# Patient Record
Sex: FEMALE | Race: WHITE | NOT HISPANIC OR LATINO | Employment: FULL TIME | URBAN - METROPOLITAN AREA
[De-identification: names, ages, dates, MRNs, and addresses within clinical notes are randomized per-mention and may not be internally consistent; named-entity substitution may affect disease eponyms.]

---

## 2023-09-11 ENCOUNTER — APPOINTMENT (OUTPATIENT)
Dept: PRIMARY CARE | Facility: CLINIC | Age: 38
End: 2023-09-11
Payer: COMMERCIAL

## 2023-10-23 PROBLEM — S83.8X1A SPRAIN OF MEDIAL MENISCUS OF RIGHT KNEE: Status: ACTIVE | Noted: 2023-10-23

## 2023-10-25 ENCOUNTER — OFFICE VISIT (OUTPATIENT)
Dept: DERMATOLOGY | Facility: CLINIC | Age: 38
End: 2023-10-25
Payer: COMMERCIAL

## 2023-10-25 DIAGNOSIS — D22.9 MULTIPLE BENIGN MELANOCYTIC NEVI: ICD-10-CM

## 2023-10-25 DIAGNOSIS — D18.01 CHERRY ANGIOMA: ICD-10-CM

## 2023-10-25 DIAGNOSIS — L73.8 SEBACEOUS HYPERPLASIA OF FACE: Primary | ICD-10-CM

## 2023-10-25 DIAGNOSIS — D22.9 NEVUS SEBACEOUS: ICD-10-CM

## 2023-10-25 PROCEDURE — 99204 OFFICE O/P NEW MOD 45 MIN: CPT | Performed by: DERMATOLOGY

## 2023-10-25 RX ORDER — TRETINOIN 0.25 MG/G
CREAM TOPICAL NIGHTLY
Qty: 20 G | Refills: 11 | Status: SHIPPED | OUTPATIENT
Start: 2023-10-25 | End: 2024-10-24

## 2023-10-25 NOTE — PROGRESS NOTES
Subjective     Olena León is a 38 y.o. female who presents for new patient visit for the following: skin check    Lesions of concern: forehead, present for months, asymptomatic, not changing  History of skin cancer: denies  Family history of skin cancer: denies  History of blistering sunburns or tanning bed use: reports use of tanning beds occasionally between age [age]    Review of Systems:  No other skin or systemic complaints other than what is documented elsewhere in the note.    The following portions of the chart were reviewed this encounter and updated as appropriate:       Specialty Problems    None    Past Medical History:  Olena León  has a past medical history of Personal history of other diseases of the respiratory system (2013), Personal history of other diseases of the respiratory system (2013), and Pregnant state, incidental.    Past Surgical History:  Olena León  has a past surgical history that includes  section, classic (2013) and Ankle arthroscopy w/ open repair (2015).    Family History:  Patient family history is not on file.    Social History:  Olena León  reports that she has never smoked. She does not have any smokeless tobacco history on file. She reports current alcohol use of about 1.0 standard drink of alcohol per week. No history on file for drug use.    Allergies:  Patient has no known allergies.    Current Medications / CAM's:    Current Outpatient Medications:     tretinoin (Retin-A) 0.025 % cream, Apply topically once daily at bedtime., Disp: 20 g, Rfl: 11     Objective   Well appearing patient in no apparent distress; mood and affect are within normal limits.    A full examination was performed including scalp, head, eyes, ears, nose, lips, neck, chest, axillae, abdomen, back, buttocks, bilateral upper extremities, bilateral lower extremities, hands, feet, fingers, toes, fingernails, and toenails. All findings within normal  limits unless otherwise noted below.    - scattered regular brown macules and papules    - scattered small bright red papules and macules    Head - Anterior (Face)  Skin colored to yellow umbilicated papules on the central forehead    Mid Occipital Scalp  Erythematous skin colored to yellow verrucous plaque on the occipital scalp       Assessment/Plan   Sebaceous hyperplasia of face  Head - Anterior (Face)    Reassured pt of benign nature of these lesions and that they represent overgrowth of oil glands on the skin  No treatment is required  Pt interested in cosmetic treatment. Reviewed option of destruction with cautery and that this would not be covered by insurance. Reviewed resident discount. Discussed that treatment will not prevent new ones from forming, risk for scarring or dark spots. Advised her to avoid sun exposure prior to and after treatment to prevent scarring.  Reviewed that topical tretinoin may help reduce the appearance of them  Start tretinoin 0.025% once nightly. Start with use every other night then gradually increase to nightly use. Reviewed side effects of dry skin. Discontinue for 5 days prior to cosmetic visit   Follow up in the winter for cosmetic destruction of sebaceous. hyperplasia on the forehead    Related Procedures  Follow Up In Dermatology - Established Patient    Related Medications  tretinoin (Retin-A) 0.025 % cream  Apply topically once daily at bedtime.    Multiple benign melanocytic nevi    Benign melanocytic nevi  - Discussed benign nature and that no treatment is necessary unless it becomes painful or increases in size. Patient opts for clinical monitoring at this time.    - Sun protective behavior reviewed and encouraged including the use of over-the-counter sunscreen with SPF30+ daily (reapply every 1.5 hours when outdoors), UPF clothing, broad rimmed hats, sunglasses, and avoidance of midday sun. Home skin monitoring encouraged and how to monitor for skin cancer (changing  or new moles, new rapidly growing or non-healing lesions) reviewed. Patient encouraged to call with interval concerns or changes.      [place] angioma    Cherry angioma(s)  - Discussed benign nature and that no treatment is necessary unless it becomes painful or increases in size. Patient opts for clinical monitoring at this time.      Nevus sebaceous  Mid Occipital Scalp    Pt reports lesion has been present since at least childhood  Reassured pt of benign nature of this lesion and that no further treatment is needed  Photo obtained today      Follow up for cosmetic visit for cautery of sebaceous hyperplasia in Winter 2024  Follow up in 2-3 years for skin check or sooner if lesions of concern    Josefa Lawrence MD

## 2023-10-25 NOTE — PROGRESS NOTES
I saw and evaluated the patient. I personally obtained the key and critical portions of the history and physical exam or was physically present for key and critical portions performed by the resident/fellow. I reviewed the resident/fellow's documentation and discussed the patient with the resident/fellow. I agree with the resident/fellow's medical decision making as documented in the note.    Aubrie Junior MD

## 2023-11-27 ENCOUNTER — APPOINTMENT (OUTPATIENT)
Dept: PRIMARY CARE | Facility: CLINIC | Age: 38
End: 2023-11-27
Payer: COMMERCIAL

## 2023-11-27 ENCOUNTER — OFFICE VISIT (OUTPATIENT)
Dept: PRIMARY CARE | Facility: CLINIC | Age: 38
End: 2023-11-27
Payer: COMMERCIAL

## 2023-11-27 VITALS
WEIGHT: 221 LBS | HEART RATE: 107 BPM | OXYGEN SATURATION: 97 % | RESPIRATION RATE: 18 BRPM | BODY MASS INDEX: 34.69 KG/M2 | SYSTOLIC BLOOD PRESSURE: 114 MMHG | HEIGHT: 67 IN | DIASTOLIC BLOOD PRESSURE: 76 MMHG

## 2023-11-27 DIAGNOSIS — R53.83 OTHER FATIGUE: ICD-10-CM

## 2023-11-27 DIAGNOSIS — Z00.00 WELLNESS EXAMINATION: Primary | ICD-10-CM

## 2023-11-27 PROBLEM — S83.8X1A SPRAIN OF MEDIAL MENISCUS OF RIGHT KNEE: Status: RESOLVED | Noted: 2023-10-23 | Resolved: 2023-11-27

## 2023-11-27 PROCEDURE — 99395 PREV VISIT EST AGE 18-39: CPT | Performed by: NURSE PRACTITIONER

## 2023-11-27 ASSESSMENT — PROMIS GLOBAL HEALTH SCALE
RATE_SOCIAL_SATISFACTION: VERY GOOD
CARRYOUT_PHYSICAL_ACTIVITIES: COMPLETELY
RATE_PHYSICAL_HEALTH: GOOD
RATE_MENTAL_HEALTH: GOOD
RATE_QUALITY_OF_LIFE: VERY GOOD
CARRYOUT_SOCIAL_ACTIVITIES: VERY GOOD
EMOTIONAL_PROBLEMS: RARELY
RATE_AVERAGE_PAIN: 0
RATE_GENERAL_HEALTH: VERY GOOD
RATE_AVERAGE_FATIGUE: MILD

## 2023-11-27 NOTE — PATIENT INSTRUCTIONS
The Plate Method.   Continue to stay active walk at least 40 min a day most days a week  It was nice to meet you!

## 2023-11-27 NOTE — PROGRESS NOTES
"Subjective   Patient ID: Olena León is a 38 y.o. female who presents for Annual Exam (Olena is here today for annual CPE, reports no concerns at this time. ).    38-year-old female here for yearly exam .  Looking to get an order for fasting blood work   always fatigued. Hot and sweaty at HS. Struggling to lose weight.   Works out- walk 3-4 miles a week.  Could be better with food choices and portions  Dental-every 6 months without concern  [id number]-date no vision concerns  Works as a teacher eighth grade  No Nicotene  GYN up to date         Review of Systems   All other systems reviewed and are negative.      Objective   /76   Pulse 107   Resp 18   Ht 1.689 m (5' 6.5\")   Wt 100 kg (221 lb)   SpO2 97%   BMI 35.14 kg/m²     Physical Exam  Vitals and nursing note reviewed.   Constitutional:       General: She is not in acute distress.     Appearance: Normal appearance. She is normal weight.   HENT:      Head: Normocephalic and atraumatic.      Nose: Nose normal.      Mouth/Throat:      Mouth: Mucous membranes are moist.      Pharynx: Oropharynx is clear.   Eyes:      Extraocular Movements: Extraocular movements intact.      Conjunctiva/sclera: Conjunctivae normal.      Pupils: Pupils are equal, round, and reactive to light.   Neck:      Vascular: No carotid bruit.   Cardiovascular:      Rate and Rhythm: Normal rate and regular rhythm.      Pulses: Normal pulses.      Heart sounds: Normal heart sounds.   Pulmonary:      Effort: Pulmonary effort is normal.      Breath sounds: Normal breath sounds.   Abdominal:      General: Bowel sounds are normal. There is no distension.      Palpations: Abdomen is soft.      Tenderness: There is no abdominal tenderness.   Musculoskeletal:         General: Normal range of motion.      Cervical back: Normal range of motion and neck supple.      Right lower leg: No edema.      Left lower leg: No edema.   Lymphadenopathy:      Cervical: No cervical adenopathy. "   Skin:     General: Skin is warm and dry.      Capillary Refill: Capillary refill takes less than 2 seconds.   Neurological:      General: No focal deficit present.      Mental Status: She is alert and oriented to person, place, and time.      Motor: No weakness.   Psychiatric:         Mood and Affect: Mood normal.         Behavior: Behavior normal.         Thought Content: Thought content normal.         Judgment: Judgment normal.         Assessment/Plan   Diagnoses and all orders for this visit:  Wellness examination  Comments:  Update fasting lipid panel, CMP, CBC at her convenience.  She does not do vaccines.  Orders:  -     CBC and Auto Differential; Future  -     Comprehensive Metabolic Panel; Future  -     Lipid Panel; Future  -     Follow Up In Primary Care - Other; Future  Other fatigue  Comments:  Check TSH  Orders:  -     TSH with reflex to Free T4 if abnormal; Future

## 2024-02-14 ENCOUNTER — OFFICE VISIT (OUTPATIENT)
Dept: DERMATOLOGY | Facility: CLINIC | Age: 39
End: 2024-02-14

## 2024-02-14 DIAGNOSIS — Z41.9 ELECTIVE SURGICAL PROCEDURE: Primary | ICD-10-CM

## 2024-02-14 DIAGNOSIS — L73.8 SEBACEOUS HYPERPLASIA OF FACE: ICD-10-CM

## 2024-02-14 PROCEDURE — 17110 DESTRUCTION B9 LES UP TO 14: CPT | Performed by: STUDENT IN AN ORGANIZED HEALTH CARE EDUCATION/TRAINING PROGRAM

## 2024-02-14 PROCEDURE — 1036F TOBACCO NON-USER: CPT | Performed by: DERMATOLOGY

## 2024-02-14 ASSESSMENT — DERMATOLOGY PATIENT ASSESSMENT
ARE YOU AN ORGAN TRANSPLANT RECIPIENT: NO
HAVE YOU HAD OR DO YOU HAVE A STAPH INFECTION: NO
DO YOU HAVE ANY NEW OR CHANGING LESIONS: NO
HAVE YOU HAD OR DO YOU HAVE VASCULAR DISEASE: NO
ARE YOU ON BIRTH CONTROL: NO
DO YOU HAVE IRREGULAR MENSTRUAL CYCLES: NO
ARE YOU TRYING TO GET PREGNANT: NO
DO YOU USE SUNSCREEN: OCCASIONALLY
DO YOU USE A TANNING BED: YES, PREVIOUSLY

## 2024-02-14 ASSESSMENT — ITCH NUMERIC RATING SCALE: HOW SEVERE IS YOUR ITCHING?: 0

## 2024-02-14 ASSESSMENT — PATIENT GLOBAL ASSESSMENT (PGA): PATIENT GLOBAL ASSESSMENT: PATIENT GLOBAL ASSESSMENT:  1 - CLEAR

## 2024-02-14 ASSESSMENT — DERMATOLOGY QUALITY OF LIFE (QOL) ASSESSMENT
RATE HOW BOTHERED YOU ARE BY SYMPTOMS OF YOUR SKIN PROBLEM (EG, ITCHING, STINGING BURNING, HURTING OR SKIN IRRITATION): 0 - NEVER BOTHERED
ARE THERE EXCLUSIONS OR EXCEPTIONS FOR THE QUALITY OF LIFE ASSESSMENT: NO
WHAT SINGLE SKIN CONDITION LISTED BELOW IS THE PATIENT ANSWERING THE QUALITY-OF-LIFE ASSESSMENT QUESTIONS ABOUT: NONE OF THE ABOVE
DATE THE QUALITY-OF-LIFE ASSESSMENT WAS COMPLETED: 66884
RATE HOW EMOTIONALLY BOTHERED YOU ARE BY YOUR SKIN PROBLEM (FOR EXAMPLE, WORRY, EMBARRASSMENT, FRUSTRATION): 0 - NEVER BOTHERED
RATE HOW BOTHERED YOU ARE BY EFFECTS OF YOUR SKIN PROBLEMS ON YOUR ACTIVITIES (EG, GOING OUT, ACCOMPLISHING WHAT YOU WANT, WORK ACTIVITIES OR YOUR RELATIONSHIPS WITH OTHERS): 0 - NEVER BOTHERED

## 2024-02-14 NOTE — PROGRESS NOTES
Subjective     Olena León is a 38 y.o. female who presents for the following: Cosmetic.     Review of Systems:  No other skin or systemic complaints other than what is documented elsewhere in the note.    The following portions of the chart were reviewed this encounter and updated as appropriate:       Specialty Problems    None    Past Medical History:  Olena León  has a past medical history of Headache, Personal history of other diseases of the respiratory system (2013), Personal history of other diseases of the respiratory system (2013), Pregnant state, incidental, Sprain of medial meniscus of right knee (10/23/2023), and Urinary tract infection.    Past Surgical History:  Olena León  has a past surgical history that includes  section, classic (2013); Ankle arthroscopy w/ open repair (2015);  section, low transverse (2013, 2015); and [place] tooth extraction ().    Family History:  Patient family history is not on file.    Social History:  Olena León  reports that she has never smoked. She has never used smokeless tobacco. She reports current alcohol use of about 3.0 standard drinks of alcohol per week. She reports that she does not use drugs.    Allergies:  Patient has no known allergies.    Current Medications / CAM's:    Current Outpatient Medications:     tretinoin (Retin-A) 0.025 % cream, Apply topically once daily at bedtime., Disp: 20 g, Rfl: 11     Objective   Well appearing patient in no apparent distress; mood and affect are within normal limits.    A focused examination was performed including face. All findings within normal limits unless otherwise noted below.    Head - Anterior (Face)  Yellow papule(s) with central dell and crown of vessels                       Assessment/Plan   Elective surgical procedure  Head - Anterior (Face)    Sebaceous hyperplasia of face  Head - Anterior (Face)    Sebaceous Hyperplasia  - Patient elects  for cosmetic treatment with hyfrecator  - Photos taken, written and/or digital and verbal informed consent obtained after risks/benefits reviewed and treatment sites confirmed.   - Risks include but not limited to pain, scabs, redness, swelling, healing with discoloration and scar, no guarantee of complete removal, need for additional treatments, does not prevent new lesions from developing.   - If make-up on, patient washed his/her face. The areas were cleansed with sterile saline.   - Hyfrecator on low setting used to treat the lesions of interest, used setting range 2.0.   - Sunscreen with SPF >30 applied prior to leaving. Reviewed strict sun protection until healed, avoid picking scabs/crusts.     Related Procedures  Follow Up In Dermatology - Established Patient    Related Medications  tretinoin (Retin-A) 0.025 % cream  Apply topically once daily at bedtime.       Follow up for FBSE as scheduled in October 2024.    Fee paid at time of visit, resident discount.     Monserrat Loaiza MD    I was present for the entirety of the procedure(s).     Aubrie Junior MD

## 2024-04-04 ENCOUNTER — TELEPHONE (OUTPATIENT)
Dept: PRIMARY CARE | Facility: CLINIC | Age: 39
End: 2024-04-04
Payer: COMMERCIAL

## 2024-04-04 NOTE — TELEPHONE ENCOUNTER
4:26 pm: Advised patient that [employer] fixed coding.    Patient left  regarding 11/27/23 visit.  It was coded with only E&M but patient said it was supposed to be wellness visit.  Activity scheduled & template was CPE but only billed E&M.  One area of note says no concerns but then I see fatigue listed. Should CPE be billed and if so is E&M appropriate too? Thanks

## 2024-05-24 ENCOUNTER — LAB (OUTPATIENT)
Dept: LAB | Facility: LAB | Age: 39
End: 2024-05-24
Payer: COMMERCIAL

## 2024-05-24 DIAGNOSIS — R53.83 OTHER FATIGUE: ICD-10-CM

## 2024-05-24 DIAGNOSIS — Z00.00 WELLNESS EXAMINATION: ICD-10-CM

## 2024-05-24 LAB
ALBUMIN SERPL BCP-MCNC: 4.1 G/DL (ref 3.4–5)
ALP SERPL-CCNC: 49 U/L (ref 33–110)
ALT SERPL W P-5'-P-CCNC: 12 U/L (ref 7–45)
ANION GAP SERPL CALC-SCNC: 12 MMOL/L (ref 10–20)
AST SERPL W P-5'-P-CCNC: 13 U/L (ref 9–39)
BASOPHILS # BLD AUTO: 0.05 X10*3/UL (ref 0–0.1)
BASOPHILS NFR BLD AUTO: 1.1 %
BILIRUB SERPL-MCNC: 0.5 MG/DL (ref 0–1.2)
BUN SERPL-MCNC: 11 MG/DL (ref 6–23)
CALCIUM SERPL-MCNC: 9 MG/DL (ref 8.6–10.6)
CHLORIDE SERPL-SCNC: 105 MMOL/L (ref 98–107)
CHOLEST SERPL-MCNC: 202 MG/DL (ref 0–199)
CHOLESTEROL/HDL RATIO: 3.3
CO2 SERPL-SCNC: 28 MMOL/L (ref 21–32)
CREAT SERPL-MCNC: 0.85 MG/DL (ref 0.5–1.05)
EGFRCR SERPLBLD CKD-EPI 2021: >90 ML/MIN/1.73M*2
EOSINOPHIL # BLD AUTO: 0.09 X10*3/UL (ref 0–0.7)
EOSINOPHIL NFR BLD AUTO: 1.9 %
ERYTHROCYTE [DISTWIDTH] IN BLOOD BY AUTOMATED COUNT: 12.3 % (ref 11.5–14.5)
GLUCOSE SERPL-MCNC: 87 MG/DL (ref 74–99)
HCT VFR BLD AUTO: 43.1 % (ref 36–46)
HDLC SERPL-MCNC: 62 MG/DL
HGB BLD-MCNC: 13.8 G/DL (ref 12–16)
IMM GRANULOCYTES # BLD AUTO: 0.02 X10*3/UL (ref 0–0.7)
IMM GRANULOCYTES NFR BLD AUTO: 0.4 % (ref 0–0.9)
LDLC SERPL CALC-MCNC: 129 MG/DL
LYMPHOCYTES # BLD AUTO: 1.47 X10*3/UL (ref 1.2–4.8)
LYMPHOCYTES NFR BLD AUTO: 31.2 %
MCH RBC QN AUTO: 30.3 PG (ref 26–34)
MCHC RBC AUTO-ENTMCNC: 32 G/DL (ref 32–36)
MCV RBC AUTO: 95 FL (ref 80–100)
MONOCYTES # BLD AUTO: 0.37 X10*3/UL (ref 0.1–1)
MONOCYTES NFR BLD AUTO: 7.9 %
NEUTROPHILS # BLD AUTO: 2.71 X10*3/UL (ref 1.2–7.7)
NEUTROPHILS NFR BLD AUTO: 57.5 %
NON HDL CHOLESTEROL: 140 MG/DL (ref 0–149)
NRBC BLD-RTO: 0 /100 WBCS (ref 0–0)
PLATELET # BLD AUTO: 346 X10*3/UL (ref 150–450)
POTASSIUM SERPL-SCNC: 4.4 MMOL/L (ref 3.5–5.3)
PROT SERPL-MCNC: 6.6 G/DL (ref 6.4–8.2)
RBC # BLD AUTO: 4.55 X10*6/UL (ref 4–5.2)
SODIUM SERPL-SCNC: 141 MMOL/L (ref 136–145)
TRIGL SERPL-MCNC: 55 MG/DL (ref 0–149)
TSH SERPL-ACNC: 1.36 MIU/L (ref 0.44–3.98)
VLDL: 11 MG/DL (ref 0–40)
WBC # BLD AUTO: 4.7 X10*3/UL (ref 4.4–11.3)

## 2024-05-24 PROCEDURE — 80061 LIPID PANEL: CPT

## 2024-05-24 PROCEDURE — 85025 COMPLETE CBC W/AUTO DIFF WBC: CPT

## 2024-05-24 PROCEDURE — 36415 COLL VENOUS BLD VENIPUNCTURE: CPT

## 2024-05-24 PROCEDURE — 80053 COMPREHEN METABOLIC PANEL: CPT

## 2024-05-24 PROCEDURE — 84443 ASSAY THYROID STIM HORMONE: CPT

## 2024-10-23 ENCOUNTER — APPOINTMENT (OUTPATIENT)
Dept: DERMATOLOGY | Facility: CLINIC | Age: 39
End: 2024-10-23
Payer: COMMERCIAL

## 2024-10-23 DIAGNOSIS — L73.8 SEBACEOUS HYPERPLASIA OF FACE: Primary | ICD-10-CM

## 2024-10-23 DIAGNOSIS — D22.9 NEVUS SEBACEOUS: ICD-10-CM

## 2024-10-23 DIAGNOSIS — L30.9 DERMATITIS: ICD-10-CM

## 2024-10-23 PROCEDURE — 1036F TOBACCO NON-USER: CPT | Performed by: DERMATOLOGY

## 2024-10-23 PROCEDURE — 99214 OFFICE O/P EST MOD 30 MIN: CPT | Performed by: DERMATOLOGY

## 2024-10-23 RX ORDER — HYDROCORTISONE 25 MG/G
CREAM TOPICAL 2 TIMES DAILY
Qty: 30 G | Refills: 3 | Status: SHIPPED | OUTPATIENT
Start: 2024-10-23 | End: 2024-11-06

## 2024-10-23 RX ORDER — TRETINOIN 0.5 MG/G
CREAM TOPICAL NIGHTLY
Qty: 20 G | Refills: 3 | Status: SHIPPED | OUTPATIENT
Start: 2024-10-23

## 2024-10-23 ASSESSMENT — DERMATOLOGY QUALITY OF LIFE (QOL) ASSESSMENT
RATE HOW BOTHERED YOU ARE BY SYMPTOMS OF YOUR SKIN PROBLEM (EG, ITCHING, STINGING BURNING, HURTING OR SKIN IRRITATION): 0 - NEVER BOTHERED
RATE HOW EMOTIONALLY BOTHERED YOU ARE BY YOUR SKIN PROBLEM (FOR EXAMPLE, WORRY, EMBARRASSMENT, FRUSTRATION): 0 - NEVER BOTHERED
RATE HOW BOTHERED YOU ARE BY EFFECTS OF YOUR SKIN PROBLEMS ON YOUR ACTIVITIES (EG, GOING OUT, ACCOMPLISHING WHAT YOU WANT, WORK ACTIVITIES OR YOUR RELATIONSHIPS WITH OTHERS): 0 - NEVER BOTHERED
RATE HOW EMOTIONALLY BOTHERED YOU ARE BY YOUR SKIN PROBLEM (FOR EXAMPLE, WORRY, EMBARRASSMENT, FRUSTRATION): 0 - NEVER BOTHERED
RATE HOW BOTHERED YOU ARE BY SYMPTOMS OF YOUR SKIN PROBLEM (EG, ITCHING, STINGING BURNING, HURTING OR SKIN IRRITATION): 1
DATE THE QUALITY-OF-LIFE ASSESSMENT WAS COMPLETED: 67136
WHAT SINGLE SKIN CONDITION LISTED BELOW IS THE PATIENT ANSWERING THE QUALITY-OF-LIFE ASSESSMENT QUESTIONS ABOUT: NONE OF THE ABOVE
RATE HOW BOTHERED YOU ARE BY EFFECTS OF YOUR SKIN PROBLEMS ON YOUR ACTIVITIES (EG, GOING OUT, ACCOMPLISHING WHAT YOU WANT, WORK ACTIVITIES OR YOUR RELATIONSHIPS WITH OTHERS): 0 - NEVER BOTHERED
RATE HOW BOTHERED YOU ARE BY EFFECTS OF YOUR SKIN PROBLEMS ON YOUR ACTIVITIES (EG, GOING OUT, ACCOMPLISHING WHAT YOU WANT, WORK ACTIVITIES OR YOUR RELATIONSHIPS WITH OTHERS): 0 - NEVER BOTHERED
RATE HOW EMOTIONALLY BOTHERED YOU ARE BY YOUR SKIN PROBLEM (FOR EXAMPLE, WORRY, EMBARRASSMENT, FRUSTRATION): 0 - NEVER BOTHERED
WHAT SINGLE SKIN CONDITION LISTED BELOW IS THE PATIENT ANSWERING THE QUALITY-OF-LIFE ASSESSMENT QUESTIONS ABOUT: NONE OF THE ABOVE
RATE HOW BOTHERED YOU ARE BY SYMPTOMS OF YOUR SKIN PROBLEM (EG, ITCHING, STINGING BURNING, HURTING OR SKIN IRRITATION): 1
ARE THERE EXCLUSIONS OR EXCEPTIONS FOR THE QUALITY OF LIFE ASSESSMENT: NO

## 2024-10-23 ASSESSMENT — DERMATOLOGY PATIENT ASSESSMENT
DO YOU USE A TANNING BED: YES, PREVIOUSLY
HAVE YOU HAD OR DO YOU HAVE VASCULAR DISEASE: NO
HAVE YOU HAD OR DO YOU HAVE A STAPH INFECTION: NO
DO YOU HAVE ANY NEW OR CHANGING LESIONS: NO
ARE YOU ON BIRTH CONTROL: NO
DO YOU USE SUNSCREEN: OCCASIONALLY
DO YOU HAVE IRREGULAR MENSTRUAL CYCLES: NO
ARE YOU TRYING TO GET PREGNANT: NO
ARE YOU AN ORGAN TRANSPLANT RECIPIENT: NO

## 2024-10-23 ASSESSMENT — PATIENT GLOBAL ASSESSMENT (PGA): PATIENT GLOBAL ASSESSMENT: PATIENT GLOBAL ASSESSMENT:  1 - CLEAR

## 2024-10-23 ASSESSMENT — ITCH NUMERIC RATING SCALE: HOW SEVERE IS YOUR ITCHING?: 0

## 2024-10-23 NOTE — PROGRESS NOTES
Subjective     Olena León is a 39 y.o. female who presents for the following: spot check (Back of scalp check from last visit.  Requesting possible increase of tretinoin.).     1 year follow up for nevus sebaceous on the scalp and sebaceous hyperplasia on the forehead    Hyfrecator treatment for sebaceous hyperplasia in 2024 with improvement.   She continues to use tretinoin 0.025% nightly on the face and is tolerating it well.    Nevus sebaceous on the scalp is stable since last visit. Denies pain, bleeding, changes in texture. She reports that she knicked it while doing her hair last week.     She notes new rash on the bilateral eyelids for a few months. Has not tried any treatments. Denies itch. Reports mild burning sensation. Denies new products on the eyes. She uses eye shadow. She does not get her nails painted.    Review of Systems:  No other skin or systemic complaints other than what is documented elsewhere in the note.    The following portions of the chart were reviewed this encounter and updated as appropriate:       Specialty Problems    None    Past Medical History:  Olena León  has a past medical history of Headache, Personal history of other diseases of the respiratory system (2013), Personal history of other diseases of the respiratory system (2013), Pregnant state, incidental ([facility]), Sprain of medial meniscus of right knee (10/23/2023), and Urinary tract infection.    Past Surgical History:  Olena León  has a past surgical history that includes  section, classic (2013); Ankle arthroscopy w/ open repair (2015);  section, low transverse (2013, 2015); and [place] tooth extraction ().    Family History:  Patient family history is not on file.    Social History:  Olena León  reports that she has never smoked. She has never used smokeless tobacco. She reports current alcohol use of about 3.0 standard drinks of alcohol per week.  She reports that she does not use drugs.    Allergies:  Patient has no known allergies.    Current Medications / CAM's:    Current Outpatient Medications:     hydrocortisone 2.5 % cream, Apply topically 2 times a day for 14 days., Disp: 30 g, Rfl: 3    tretinoin (Retin-A) 0.05 % cream, Apply topically once daily at bedtime., Disp: 20 g, Rfl: 3     Objective   Well appearing patient in no apparent distress; mood and affect are within normal limits.    A focused examination was performed including scalp, head, eyes, ears, nose, lips, neck. All findings within normal limits unless otherwise noted below.    Head - Anterior (Face)  Skin colored to yellow umbilicated papules on the central forehead    Left Eye, Right Eye  Thin erythematous plaques with fine scale on bilateral eyelids   Eye shadow present on both eyelids    Mid Occipital Scalp  Erythematous skin colored to yellow uniform, symmetrical verrucous plaque with central erosion with hemorrhagic crust on the occipital scalp       Assessment/Plan   Sebaceous hyperplasia of face  Head - Anterior (Face)    - S/p Hyfrecator treatment in 2/2024  - Reports significant improvement and would like to increase concentration of tretinoin  -START tretinoin 0.05% every other night and increase use to nightly as tolerated. Reviewed side effects of dry skin. Discontinue for 5 days prior to cosmetic visit   - DISCONTINUE tretinoin 0.025%  - Could consider cosmetic Hyfrecator treatment in the future if desired    Related Procedures  Follow Up In Dermatology - Established Patient    Related Medications  tretinoin (Retin-A) 0.05 % cream  Apply topically once daily at bedtime.    Dermatitis  Left Eye; Right Eye    Favor allergic vs irritant dermatitis on the eyelids, possibly 2/2 eye shadow  - Start Hydrocortisone 2.5% cream BID for 1 weeks then transition to vaseline. May retreat as needed. Reviewed side effects of skin thinning and discoloration with chronic long term use. Avoid  getting in eye.   - Start vaseline twice daily as needed for dry skin  - Discontinue use of eyeshadow as this may be causing the irritation  - If not improved and continuing to flare, consider referral for patch testing. Send mychart message if worsening before follow up    Related Procedures  Follow Up In Dermatology - Established Patient    Related Medications  hydrocortisone 2.5 % cream  Apply topically 2 times a day for 14 days.    Nevus sebaceous  Mid Occipital Scalp    - Present since at least childhood, has not noticed any changes since last year   - Reassured pt of benign nature of this lesion, but that there is a small risk of skin cancers developing within the lesion which is why we recommend continued monitoring  - Discussed option of excision if lesion is bothersome to her. Declines excision at this time  - Low threshold for biopsy with changes/concerns       Follow up in 1 year for sebaceous hyperplasia and nevus sebaceous    Josefa Lawrence MD   PGY-5 Dermatology Resident    I saw and evaluated the patient. I personally obtained the key and critical portions of the history and physical exam or was physically present for key and critical portions performed by the resident/fellow. I reviewed the resident/fellow's documentation and discussed the patient with the resident/fellow. I agree with the resident/fellow's medical decision making as documented in the note.    Aubrie Junior MD

## 2025-02-26 ENCOUNTER — HOSPITAL ENCOUNTER (OUTPATIENT)
Dept: RADIOLOGY | Facility: HOSPITAL | Age: 40
Discharge: HOME | End: 2025-02-26
Payer: COMMERCIAL

## 2025-02-26 ENCOUNTER — OFFICE VISIT (OUTPATIENT)
Dept: ORTHOPEDIC SURGERY | Facility: CLINIC | Age: 40
End: 2025-02-26
Payer: COMMERCIAL

## 2025-02-26 VITALS — BODY MASS INDEX: 35.52 KG/M2 | WEIGHT: 221 LBS | HEIGHT: 66 IN

## 2025-02-26 DIAGNOSIS — M25.572 LEFT ANKLE PAIN, UNSPECIFIED CHRONICITY: ICD-10-CM

## 2025-02-26 DIAGNOSIS — S93.402A MILD ANKLE SPRAIN, LEFT, INITIAL ENCOUNTER: Primary | ICD-10-CM

## 2025-02-26 PROCEDURE — 3008F BODY MASS INDEX DOCD: CPT

## 2025-02-26 PROCEDURE — 1036F TOBACCO NON-USER: CPT

## 2025-02-26 PROCEDURE — 99204 OFFICE O/P NEW MOD 45 MIN: CPT

## 2025-02-26 PROCEDURE — 73610 X-RAY EXAM OF ANKLE: CPT | Mod: LT

## 2025-02-26 ASSESSMENT — PAIN - FUNCTIONAL ASSESSMENT: PAIN_FUNCTIONAL_ASSESSMENT: NO/DENIES PAIN

## 2025-02-26 NOTE — PROGRESS NOTES
HPI  Olena León is a 39 y.o. female  in office today for   Chief Complaint   Patient presents with    Left Ankle - Injury   .  she states she was walking two days ago and her ankle gave out.  Could not find anything that she stepped on or a reason for the ankle twisting.  She initially states it was painful, but has been improving over the last few days.  Has been resting, has a soft brace she has been wearing.  Has a history of ankle fracture on the right and wanted to make sure nothing was broken or serious on the left.      Medication  Current Outpatient Medications on File Prior to Visit   Medication Sig Dispense Refill    hydrocortisone 2.5 % cream Apply topically 2 times a day for 14 days. 30 g 3    tretinoin (Retin-A) 0.05 % cream Apply topically once daily at bedtime. 20 g 3     No current facility-administered medications on file prior to visit.       Physical Exam  Constitutional: well developed, well nourished female in no acute distress  Psychiatric: normal mood, appropriate affect  Eyes: sclera anicteric  HENT: normocephalic/atraumatic  CV: regular rate and rhythm   Respiratory: non labored breathing  Integumentary: no rash  Neurological: moves all extremities    Left Ankle Exam     Tenderness   The patient is experiencing tenderness in the CF.   Swelling: mild (lateral distal to malleolus)    Range of Motion   Dorsiflexion:  20   Plantar flexion:  40   Eversion:  10   Inversion:  30     Muscle Strength   Dorsiflexion:  5/5   Plantar flexion:  4/5     Tests   Anterior drawer: negative  Varus tilt: negative    Other   Erythema: absent  Scars: absent  Sensation: normal    Comments:  Ambulating independently without significant limp              Imaging/Lab:  X-rays were taken today which were reviewed by myself and read by myself and show no acute fracture or dislocation.  Joint spaces maintained, small osteophyte formation      Assessment  Assessment: left ankle sprain    Plan  Plan:  History,  physical exam, and imaging were reviewed with patient. Discussed trying a boot for immobilization for a few days while the pain improves.  She will purchase a boot if she would like.  Continue to be WB as tolerated.  Recommended continued rest, no jogging, jumping etc until she is pain free and then gradual return to activity.  Discussed working with PT to help with current injury and to prevent future ankle sprains in the future.  She declined PT for now, will consider if pain persists.  Continue with RICE and antiinflammatories as needed.  Follow Up: 2 weeks if pain persists or if it gets worse.    All questions were answered for the patient prior to end of exam and patient addressed their understanding.    Antonia Sanchez PA-C  02/26/25

## 2025-08-08 ENCOUNTER — APPOINTMENT (OUTPATIENT)
Facility: CLINIC | Age: 40
End: 2025-08-08
Payer: COMMERCIAL

## 2025-09-12 ENCOUNTER — APPOINTMENT (OUTPATIENT)
Dept: DERMATOLOGY | Facility: CLINIC | Age: 40
End: 2025-09-12
Payer: COMMERCIAL

## 2025-09-23 ENCOUNTER — APPOINTMENT (OUTPATIENT)
Facility: CLINIC | Age: 40
End: 2025-09-23
Payer: COMMERCIAL

## 2025-10-22 ENCOUNTER — APPOINTMENT (OUTPATIENT)
Dept: DERMATOLOGY | Facility: CLINIC | Age: 40
End: 2025-10-22
Payer: COMMERCIAL